# Patient Record
Sex: MALE | Race: BLACK OR AFRICAN AMERICAN | NOT HISPANIC OR LATINO | URBAN - METROPOLITAN AREA
[De-identification: names, ages, dates, MRNs, and addresses within clinical notes are randomized per-mention and may not be internally consistent; named-entity substitution may affect disease eponyms.]

---

## 2019-11-02 ENCOUNTER — EMERGENCY (EMERGENCY)
Facility: HOSPITAL | Age: 44
LOS: 1 days | Discharge: HOME | End: 2019-11-02
Attending: EMERGENCY MEDICINE | Admitting: EMERGENCY MEDICINE
Payer: COMMERCIAL

## 2019-11-02 VITALS
HEART RATE: 100 BPM | SYSTOLIC BLOOD PRESSURE: 134 MMHG | DIASTOLIC BLOOD PRESSURE: 81 MMHG | TEMPERATURE: 99 F | RESPIRATION RATE: 18 BRPM | OXYGEN SATURATION: 100 %

## 2019-11-02 VITALS
DIASTOLIC BLOOD PRESSURE: 57 MMHG | TEMPERATURE: 100 F | OXYGEN SATURATION: 97 % | HEART RATE: 104 BPM | RESPIRATION RATE: 16 BRPM | SYSTOLIC BLOOD PRESSURE: 114 MMHG

## 2019-11-02 PROCEDURE — 71046 X-RAY EXAM CHEST 2 VIEWS: CPT | Mod: 26

## 2019-11-02 PROCEDURE — 99284 EMERGENCY DEPT VISIT MOD MDM: CPT

## 2019-11-02 RX ORDER — KETOROLAC TROMETHAMINE 30 MG/ML
15 SYRINGE (ML) INJECTION ONCE
Refills: 0 | Status: DISCONTINUED | OUTPATIENT
Start: 2019-11-02 | End: 2019-11-02

## 2019-11-02 RX ORDER — SODIUM CHLORIDE 9 MG/ML
1000 INJECTION INTRAMUSCULAR; INTRAVENOUS; SUBCUTANEOUS ONCE
Refills: 0 | Status: COMPLETED | OUTPATIENT
Start: 2019-11-02 | End: 2019-11-02

## 2019-11-02 RX ORDER — METOCLOPRAMIDE HCL 10 MG
10 TABLET ORAL ONCE
Refills: 0 | Status: COMPLETED | OUTPATIENT
Start: 2019-11-02 | End: 2019-11-02

## 2019-11-02 RX ADMIN — SODIUM CHLORIDE 1000 MILLILITER(S): 9 INJECTION INTRAMUSCULAR; INTRAVENOUS; SUBCUTANEOUS at 15:04

## 2019-11-02 RX ADMIN — Medication 15 MILLIGRAM(S): at 16:34

## 2019-11-02 RX ADMIN — Medication 10 MILLIGRAM(S): at 15:04

## 2019-11-02 RX ADMIN — Medication 15 MILLIGRAM(S): at 15:04

## 2019-11-02 RX ADMIN — SODIUM CHLORIDE 1000 MILLILITER(S): 9 INJECTION INTRAMUSCULAR; INTRAVENOUS; SUBCUTANEOUS at 16:34

## 2019-11-02 NOTE — ED ADULT NURSE NOTE - OBJECTIVE STATEMENT
Pt presents to intake, A&Ox4, ambulatory w/o assistance, here for evaluation of headache, cough, nausea and body aches since last night. Pt states the headache worsens when he coughs. Denies chest pain, shortness of breath, palpitations, diaphoresis, fevers, dizziness, vomiting, diarrhea, or urinary symptoms at this time. IV established in right AC with a 20G, no labs sent as per MD Delgado's orders, call bell in reach, warm blanket provided, bed in lowest position, side rails up x2, MD evaluation in progress. Will continue to monitor.

## 2019-11-02 NOTE — ED PROVIDER NOTE - OBJECTIVE STATEMENT
44 yo M non-smoker no pmh presents with cough, headache, myalgia.  Patient awoke this morning with sx.  States that cough is minimally productive, non-bloody.  Headache was non-acute onset, persistent, global, worse with coughing and without neck stiffness or photophobia.  Patient reports chills, no measured temperatures prior to arrival and he has not taken any anti-pyretics today.  States that son was ill with URI-like sx ~1 week ago.  Lives with wife and children, who are now all asymptomatic. Traveled to [place] late september. Denies etoh or illicit drug use.

## 2019-11-02 NOTE — ED PROVIDER NOTE - PATIENT PORTAL LINK FT
You can access the FollowMyHealth Patient Portal offered by [facility] by registering at the following website: [email or web address]. By joining [employer]’s FollowMyHealth portal, you will also be able to view your health information using other applications (apps) compatible with our system.

## 2019-11-02 NOTE — ED PROVIDER NOTE - NSFOLLOWUPINSTRUCTIONS_ED_ALL_ED_FT
1.  Please take tylenol 500 mg and motrin 400 mg every 6-8 hours as needed for headache.   2.  Return to care for fever, chest pain, difficulty breathing, worsening headache, numbness, weakness, or vomiting.   3.  See your primary care doctor as early as able.

## 2019-11-02 NOTE — ED PROVIDER NOTE - NEUROLOGICAL, MLM
Alert and oriented, no focal deficits, no motor or sensory deficits.  Motor 5/5 in ue and le b/l, sensation grossly intact, no gait deficit, no nystagmus.  No CN deficits.

## 2019-11-02 NOTE — ED PROVIDER NOTE - ENMT, MLM
Airway patent, Nasal mucosa clear. Mouth with normal mucosa. Throat has no vesicles, no oropharyngeal exudates and uvula is midline.  Neck supple.

## 2019-11-02 NOTE — ED PROVIDER NOTE - CLINICAL SUMMARY MEDICAL DECISION MAKING FREE TEXT BOX
44 yo M non-smoker no pmh presents with cough, headache, myalgia x1 day.  VSS AF.  Exam non-toxic appearing, neck supple, no focal neuro deficit, lungs clear, no focal neuro deficit.  Suspect viral syndrome, bronchitis.  Low c/f sepsis.  No c/f sah as ha no acute onset, not maximal in onset, no photophobia or neck stiffness.  No c/f cns infection at this time.  Will obtain cxr, ivf, analgesia.  Discussion had with patient regarding benefits of influenza testing and possible treatment and patient declines at this time.  Dispo pending.

## 2024-03-18 NOTE — ED ADULT TRIAGE NOTE - TEMPERATURE IN CELSIUS (DEGREES C)
37.3 Bed in lowest position, wheels locked, appropriate side rails in place/Call bell, personal items and telephone in reach/Instruct patient to call for assistance before getting out of bed or chair/Non-slip footwear when patient is out of bed/[place] to call system/Physically safe environment - no spills, clutter or unnecessary equipment/Purposeful Proactive Rounding/Room/bathroom lighting operational, light cord in reach

## 2024-09-24 NOTE — ED PROVIDER NOTE - CPE EDP GASTRO NORM
You can access the FollowMyHealth Patient Portal offered by [facility] by registering at the following website: [email or web address]. By joining [employer]’s FollowMyHealth portal, you will also be able to view your health information using other applications (apps) compatible with our system.
normal...

## 2024-11-15 ENCOUNTER — EMERGENCY (EMERGENCY)
Facility: HOSPITAL | Age: 49
LOS: 1 days | Discharge: HOME | End: 2024-11-15
Attending: EMERGENCY MEDICINE | Admitting: EMERGENCY MEDICINE
Payer: SELF-PAY

## 2024-11-15 VITALS
SYSTOLIC BLOOD PRESSURE: 123 MMHG | TEMPERATURE: 100 F | WEIGHT: 205.03 LBS | DIASTOLIC BLOOD PRESSURE: 84 MMHG | OXYGEN SATURATION: 100 % | HEART RATE: 89 BPM | RESPIRATION RATE: 16 BRPM | HEIGHT: 66 IN

## 2024-11-15 VITALS
HEART RATE: 91 BPM | SYSTOLIC BLOOD PRESSURE: 117 MMHG | RESPIRATION RATE: 16 BRPM | OXYGEN SATURATION: 100 % | TEMPERATURE: 100 F | DIASTOLIC BLOOD PRESSURE: 69 MMHG

## 2024-11-15 LAB
ADD ON TEST-SPECIMEN IN LAB: SIGNIFICANT CHANGE UP
ALBUMIN SERPL ELPH-MCNC: 4.1 G/DL — SIGNIFICANT CHANGE UP (ref 3.3–5)
ALP SERPL-CCNC: 63 U/L — SIGNIFICANT CHANGE UP (ref 40–120)
ALT FLD-CCNC: 51 U/L — HIGH (ref 4–41)
ANION GAP SERPL CALC-SCNC: 13 MMOL/L — SIGNIFICANT CHANGE UP (ref 7–14)
APPEARANCE UR: CLEAR — SIGNIFICANT CHANGE UP
APTT BLD: 32.6 SEC — SIGNIFICANT CHANGE UP (ref 24.5–35.6)
AST SERPL-CCNC: 52 U/L — HIGH (ref 4–40)
BASOPHILS # BLD AUTO: 0.04 K/UL — SIGNIFICANT CHANGE UP (ref 0–0.2)
BASOPHILS NFR BLD AUTO: 0.3 % — SIGNIFICANT CHANGE UP (ref 0–2)
BILIRUB SERPL-MCNC: 0.8 MG/DL — SIGNIFICANT CHANGE UP (ref 0.2–1.2)
BILIRUB UR-MCNC: NEGATIVE — SIGNIFICANT CHANGE UP
BLD GP AB SCN SERPL QL: NEGATIVE — SIGNIFICANT CHANGE UP
BUN SERPL-MCNC: 14 MG/DL — SIGNIFICANT CHANGE UP (ref 7–23)
CALCIUM SERPL-MCNC: 9.1 MG/DL — SIGNIFICANT CHANGE UP (ref 8.4–10.5)
CHLORIDE SERPL-SCNC: 101 MMOL/L — SIGNIFICANT CHANGE UP (ref 98–107)
CO2 SERPL-SCNC: 23 MMOL/L — SIGNIFICANT CHANGE UP (ref 22–31)
COLOR SPEC: YELLOW — SIGNIFICANT CHANGE UP
CREAT SERPL-MCNC: 1.29 MG/DL — SIGNIFICANT CHANGE UP (ref 0.5–1.3)
DIFF PNL FLD: NEGATIVE — SIGNIFICANT CHANGE UP
EGFR: 69 ML/MIN/1.73M2 — SIGNIFICANT CHANGE UP
EOSINOPHIL # BLD AUTO: 0.05 K/UL — SIGNIFICANT CHANGE UP (ref 0–0.5)
EOSINOPHIL NFR BLD AUTO: 0.4 % — SIGNIFICANT CHANGE UP (ref 0–6)
GAS PNL BLDV: SIGNIFICANT CHANGE UP
GLUCOSE SERPL-MCNC: 69 MG/DL — LOW (ref 70–99)
GLUCOSE UR QL: NEGATIVE MG/DL — SIGNIFICANT CHANGE UP
HCT VFR BLD CALC: 44.2 % — SIGNIFICANT CHANGE UP (ref 39–50)
HGB BLD-MCNC: 14.4 G/DL — SIGNIFICANT CHANGE UP (ref 13–17)
IANC: 11.15 K/UL — HIGH (ref 1.8–7.4)
IMM GRANULOCYTES NFR BLD AUTO: 0.4 % — SIGNIFICANT CHANGE UP (ref 0–0.9)
INR BLD: 1.07 RATIO — SIGNIFICANT CHANGE UP (ref 0.85–1.16)
KETONES UR-MCNC: ABNORMAL MG/DL
LEUKOCYTE ESTERASE UR-ACNC: NEGATIVE — SIGNIFICANT CHANGE UP
LIDOCAIN IGE QN: 13 U/L — SIGNIFICANT CHANGE UP (ref 7–60)
LYMPHOCYTES # BLD AUTO: 1.11 K/UL — SIGNIFICANT CHANGE UP (ref 1–3.3)
LYMPHOCYTES # BLD AUTO: 8.3 % — LOW (ref 13–44)
MCHC RBC-ENTMCNC: 27.2 PG — SIGNIFICANT CHANGE UP (ref 27–34)
MCHC RBC-ENTMCNC: 32.6 G/DL — SIGNIFICANT CHANGE UP (ref 32–36)
MCV RBC AUTO: 83.4 FL — SIGNIFICANT CHANGE UP (ref 80–100)
MONOCYTES # BLD AUTO: 1 K/UL — HIGH (ref 0–0.9)
MONOCYTES NFR BLD AUTO: 7.5 % — SIGNIFICANT CHANGE UP (ref 2–14)
NEUTROPHILS # BLD AUTO: 11.15 K/UL — HIGH (ref 1.8–7.4)
NEUTROPHILS NFR BLD AUTO: 83.1 % — HIGH (ref 43–77)
NITRITE UR-MCNC: NEGATIVE — SIGNIFICANT CHANGE UP
NRBC # BLD: 0 /100 WBCS — SIGNIFICANT CHANGE UP (ref 0–0)
NRBC # FLD: 0 K/UL — SIGNIFICANT CHANGE UP (ref 0–0)
PH UR: 7 — SIGNIFICANT CHANGE UP (ref 5–8)
PLATELET # BLD AUTO: 224 K/UL — SIGNIFICANT CHANGE UP (ref 150–400)
POTASSIUM SERPL-MCNC: 4.2 MMOL/L — SIGNIFICANT CHANGE UP (ref 3.5–5.3)
POTASSIUM SERPL-SCNC: 4.2 MMOL/L — SIGNIFICANT CHANGE UP (ref 3.5–5.3)
PROT SERPL-MCNC: 7.6 G/DL — SIGNIFICANT CHANGE UP (ref 6–8.3)
PROT UR-MCNC: NEGATIVE MG/DL — SIGNIFICANT CHANGE UP
PROTHROM AB SERPL-ACNC: 12.7 SEC — SIGNIFICANT CHANGE UP (ref 9.9–13.4)
RBC # BLD: 5.3 M/UL — SIGNIFICANT CHANGE UP (ref 4.2–5.8)
RBC # FLD: 14.4 % — SIGNIFICANT CHANGE UP (ref 10.3–14.5)
RH IG SCN BLD-IMP: NEGATIVE — SIGNIFICANT CHANGE UP
SODIUM SERPL-SCNC: 137 MMOL/L — SIGNIFICANT CHANGE UP (ref 135–145)
SP GR SPEC: 1.01 — SIGNIFICANT CHANGE UP (ref 1–1.03)
UROBILINOGEN FLD QL: 0.2 MG/DL — SIGNIFICANT CHANGE UP (ref 0.2–1)
WBC # BLD: 13.4 K/UL — HIGH (ref 3.8–10.5)
WBC # FLD AUTO: 13.4 K/UL — HIGH (ref 3.8–10.5)

## 2024-11-15 PROCEDURE — 74177 CT ABD & PELVIS W/CONTRAST: CPT | Mod: 26,MC

## 2024-11-15 PROCEDURE — 99285 EMERGENCY DEPT VISIT HI MDM: CPT

## 2024-11-15 RX ORDER — KETOROLAC TROMETHAMINE 30 MG/ML
15 INJECTION INTRAMUSCULAR; INTRAVENOUS ONCE
Refills: 0 | Status: DISCONTINUED | OUTPATIENT
Start: 2024-11-15 | End: 2024-11-15

## 2024-11-15 RX ORDER — METRONIDAZOLE 250 MG/1
1 TABLET ORAL
Qty: 20 | Refills: 0
Start: 2024-11-15 | End: 2024-11-24

## 2024-11-15 RX ORDER — SENNA 187 MG
2 TABLET ORAL ONCE
Refills: 0 | Status: COMPLETED | OUTPATIENT
Start: 2024-11-15 | End: 2024-11-15

## 2024-11-15 RX ORDER — CIPROFLOXACIN LACTATE 200MG/20ML
1 VIAL (ML) INTRAVENOUS
Qty: 20 | Refills: 0
Start: 2024-11-15 | End: 2024-11-24

## 2024-11-15 RX ORDER — ONDANSETRON HYDROCHLORIDE 2 MG/ML
4 INJECTION, SOLUTION INTRAMUSCULAR; INTRAVENOUS ONCE
Refills: 0 | Status: COMPLETED | OUTPATIENT
Start: 2024-11-15 | End: 2024-11-15

## 2024-11-15 RX ORDER — POLYETHYLENE GLYCOL 3350 17 G/17G
17 POWDER, FOR SOLUTION ORAL ONCE
Refills: 0 | Status: COMPLETED | OUTPATIENT
Start: 2024-11-15 | End: 2024-11-15

## 2024-11-15 RX ORDER — METRONIDAZOLE 250 MG/1
1 TABLET ORAL
Qty: 15 | Refills: 0
Start: 2024-11-15 | End: 2024-11-19

## 2024-11-15 RX ORDER — ACETAMINOPHEN 500 MG
1000 TABLET ORAL ONCE
Refills: 0 | Status: COMPLETED | OUTPATIENT
Start: 2024-11-15 | End: 2024-11-15

## 2024-11-15 RX ORDER — CIPROFLOXACIN LACTATE 200MG/20ML
400 VIAL (ML) INTRAVENOUS ONCE
Refills: 0 | Status: DISCONTINUED | OUTPATIENT
Start: 2024-11-15 | End: 2024-11-15

## 2024-11-15 RX ORDER — CIPROFLOXACIN LACTATE 200MG/20ML
1 VIAL (ML) INTRAVENOUS
Qty: 10 | Refills: 0
Start: 2024-11-15 | End: 2024-11-19

## 2024-11-15 RX ORDER — METRONIDAZOLE 250 MG/1
500 TABLET ORAL ONCE
Refills: 0 | Status: DISCONTINUED | OUTPATIENT
Start: 2024-11-15 | End: 2024-11-15

## 2024-11-15 RX ADMIN — KETOROLAC TROMETHAMINE 15 MILLIGRAM(S): 30 INJECTION INTRAMUSCULAR; INTRAVENOUS at 12:16

## 2024-11-15 RX ADMIN — Medication 400 MILLIGRAM(S): at 12:16

## 2024-11-15 RX ADMIN — Medication 1000 MILLILITER(S): at 15:09

## 2024-11-15 RX ADMIN — Medication 1000 MILLIGRAM(S): at 13:15

## 2024-11-15 RX ADMIN — KETOROLAC TROMETHAMINE 15 MILLIGRAM(S): 30 INJECTION INTRAMUSCULAR; INTRAVENOUS at 15:00

## 2024-11-15 RX ADMIN — Medication 1000 MILLILITER(S): at 12:16

## 2024-11-15 RX ADMIN — ONDANSETRON HYDROCHLORIDE 4 MILLIGRAM(S): 2 INJECTION, SOLUTION INTRAMUSCULAR; INTRAVENOUS at 13:01

## 2024-11-15 RX ADMIN — Medication 2 TABLET(S): at 15:09

## 2024-11-15 RX ADMIN — POLYETHYLENE GLYCOL 3350 17 GRAM(S): 17 POWDER, FOR SOLUTION ORAL at 15:09

## 2024-11-15 NOTE — ED PROVIDER NOTE - DISPOSITION TYPE
Pt rec'd resting in bed , withdrawls to painful stimulus/slight facial grimace observed. Continuing to wean off pressors as tolerated by pt. Sent and obtained sputum cultures/urine culture ordered from 10am. Appears comfortable, no distress noted. 01:00  Frequent mouth care given, secretions remain minimal.  02:00  CXR completed, requested wet reading to be called due to appearance of film. 02:23  Notified by Dr. Joelyn Opitz of pts atelectasis present on right lung ? Plug. Denies pnemo. Currenty pt sats % 70% fio2. Will update and notify Intensivist.     05:00-06:00  Partial bath complete, unable to turn pt completely due to difficulty / increase in resp distress. Notified Dr. Rivas Guerra of CXR results, new orders rec'd, RT notified. Redressed pts lower and medial mepilex foam dressings to bilateral lower ext's. Nystatin powder placed  To pts skin folds anteriorly after washing. Continues to have brown pus like drainage from byrne cath site. DISCHARGE

## 2024-11-15 NOTE — ED ADULT TRIAGE NOTE - CHIEF COMPLAINT QUOTE
Patient c/o lower abdominal pain and nausea starting yesterday morning. Last bowel movement 2 days ago. Denies vomiting, urinary symptoms, fever. Denies phx

## 2024-11-15 NOTE — ED PROVIDER NOTE - PROGRESS NOTE DETAILS
Av Hollins (EM/IM PGY-1). Vital signs stable. Patient reporting some improvement of his symptoms after medications. WBC 13.4, Lactate 2.7. Will give a LR 1 L bolus and re-check lactate, pending CT A/P. Results discussed with patient. Transaminases mildly elevated but hemolyzed. Av Hollins (EM/IM PGY-1). Vital signs stable. CT showing evidence of proctocolitis. Will discharge on cipro/metronidazole with GI follow up. Pending repeat lactate.

## 2024-11-15 NOTE — ED PROVIDER NOTE - ATTENDING CONTRIBUTION TO CARE
abdominal pain as described tenderness with CT + for proctocolitis in setting of elevated WBCS no other complaints and no rectal pain; denies high risk behaviors  Plan to dc with cipro flagyl and gi /pcp followup and w/u for other causes of coltis (i.e inflammatory bowel dz etc)  RTED PRN

## 2024-11-15 NOTE — ED PROVIDER NOTE - NSPTACCESSSVCSAPPTDETAILS_ED_ALL_ED_FT
Please assist patient with close follow up within a week with gastroenterologist for further workup.

## 2024-11-15 NOTE — ED ADULT NURSE NOTE - OBJECTIVE STATEMENT
This is a 49 y/0 male alert and oriented x 4, with past medical history. Complaining of a 2 day history of lower abdominal pain 5/10, associated with headache and chills.  unable to go to the bathroom x 2 days, denies urinary symptoms.  Denies vomiting. Abdomen is tender on palpation. IV initiated on left hand g 20 blood work sent to lab, due meds given. Waiting for results, bed in lowest position, side rails up. Family at bedside.

## 2024-11-15 NOTE — ED ADULT NURSE NOTE - NSFALLUNIVINTERV_ED_ALL_ED
Bed/Stretcher in lowest position, wheels locked, appropriate side rails in place/Call bell, personal items and telephone in reach/Instruct patient to call for assistance before getting out of bed/chair/stretcher/Non-slip footwear applied when patient is off stretcher/[place] to call system/Physically safe environment - no spills, clutter or unnecessary equipment/Purposeful proactive rounding/Room/bathroom lighting operational, light cord in reach

## 2024-11-15 NOTE — ED PROVIDER NOTE - PATIENT PORTAL LINK FT
You can access the FollowMyHealth Patient Portal offered by [facility] by registering at the following website: [email or web address]. By joining [employer]’s FollowMyHealth portal, you will also be able to view your health information using other applications (apps) compatible with our system.

## 2024-11-15 NOTE — ED PROVIDER NOTE - PHYSICAL EXAMINATION
GENERAL: No acute distress, non toxic-appearing  HEAD: Atraumatic, normocephalic  EARS: Externally normal, atraumatic  EYES: No jaundice, not injected, no rupture, no foreign bodies  MOUTH: Moist mucous membranes  NECK: No swelling, no lymphadenopathy  HEART: Regular rate and rhythm, normal S1/S2, no murmurs, no rubs, no gallops  LUNGS: Clear to auscultation bilaterally without rhonchi, rales, or wheezing  ABDOMEN: Soft, non-distended. (+) LLQ tenderness w/ voluntary guarding. No rigidity, rebound tenderness.  EXTREMITIES: No gross deformities  VASCULAR: Pulses palpable in all extremities, no pitting edema, capillary refill <2 secs  SKIN: Grossly intact without rash or open wounds  PSYCH: Alert and oriented x 3  NEURO: Strength 5/5 and sensation intact in all 4 extremities.

## 2024-11-15 NOTE — ED PROVIDER NOTE - CLINICAL SUMMARY MEDICAL DECISION MAKING FREE TEXT BOX
48 y/o M w/ no significant PMHx who presents with 2 days of lower abdominal pain L > Q which has now become more generalized. Associated with nausea,  but no vomiting or diarrhea, as well as feeling bloated and without any bowel movements in 2 days. Usually has 2-3 BMs a day, last was soft and normal without blood. Also reporting subjective chills but no fever. Patient states he has had similar episodes in past with resolution of symptoms after a large flatus and profuse BMs afterwards. Denies any procedures to the abdomen, no other surgical history. Last meal was yesterday around 1400, continues to drink some fluids. Denies any chest pain, shortness of breath, dysuria/hematuria.    Exam significant for LLQ tenderness. Non-surgical abdomen at this time, vitals are stable with no fever. Differential diagnosis includes but is not limited to: LBO vs diverticulosis/itis vs less likely SBO. Low suspicion of testicular torsion given nature of pain.  - CBC, CMP, Mg, VBG, lipase  - PT/PTT, T&S  - CT A/P w/ IV contrast  -  mL bolus, Ofirmev 1 g, ketorolac 15 IVP

## 2024-11-15 NOTE — ED PROVIDER NOTE - NSFOLLOWUPINSTRUCTIONS_ED_ALL_ED_FT
You were seen in the emergency department for stomach pain. We have evaluated you and determined that you do not require further hospital interventions.    During your stay you had the following relevant results: CT scan showing proctocolitis (inflammation of the ending of your colon). An elevated white blood cell count which was consistent with the inflammation seen on CT scan. An elevated lactate which went down after some fluids which is also consistent with inflammation.    Please follow up with your primary care provider and a gastroenterologist as necessary to discuss the results of your stay in our department.    Take your antibiotics for the full course, you will be taking ciprofloxacin 500 mg every 12 hours and metronidazole 500 mg every 8 hours for 5 days.    If you start to experience worsening symptoms such as worsening pain, nausea, vomiting, diarrhea, chest pain, shortness of breath, worsening or new symptoms, please return to the emergency department for further evaluation.    Abdominal Pain, Adult    A health care provider talking to a person during a medical exam.  Pain in the abdomen (abdominal pain) can be caused by many things. In most cases, it gets better with no treatment or by being treated at home. But in some cases, it can be serious.    Your health care provider will ask questions about your medical history and do a physical exam to try to figure out what is causing your pain.    Follow these instructions at home:    Medicines    Take over-the-counter and prescription medicines only as told by your provider.  Do not take medicines that help you poop (laxatives) unless told by your provider.    General instructions    Watch your condition for any changes.  Drink enough fluid to keep your pee (urine) pale yellow.    Contact a health care provider if:  Your pain changes, gets worse, or lasts longer than expected.  You have severe cramping or bloating in your abdomen, or you vomit.  Your pain gets worse with meals, after eating, or with certain foods.  You are constipated or have diarrhea for more than 2–3 days.  You are not hungry, or you lose weight without trying.  You have signs of dehydration. These may include:  Dark pee, very little pee, or no pee.  Cracked lips or dry mouth.  Sleepiness or weakness.  You have pain when you pee (urinate) or poop.  Your abdominal pain wakes you up at night.  You have blood in your pee.  You have a fever.    Get help right away if:  You cannot stop vomiting.  Your pain is only in one part of the abdomen. Pain on the right side could be caused by appendicitis.  You have bloody or black poop (stool), or poop that looks like tar.  You have trouble breathing.  You have chest pain.  These symptoms may be an emergency. Get help right away. Call 911.  Do not wait to see if the symptoms will go away.  Do not drive yourself to the hospital.

## 2024-11-25 NOTE — ED POST DISCHARGE NOTE - ADDITIONAL DOCUMENTATION
Pt returned to the ED on 11/25/24 requesting his discharge papers from his visit on 11/15/24. Accessed chart to print pt's discharged papers.